# Patient Record
Sex: MALE | Race: WHITE
[De-identification: names, ages, dates, MRNs, and addresses within clinical notes are randomized per-mention and may not be internally consistent; named-entity substitution may affect disease eponyms.]

---

## 2018-01-20 ENCOUNTER — HOSPITAL ENCOUNTER (EMERGENCY)
Dept: HOSPITAL 54 - ER | Age: 58
Discharge: LEFT BEFORE DISCHARGE | End: 2018-01-20
Payer: MEDICAID

## 2018-01-20 VITALS — WEIGHT: 165 LBS | BODY MASS INDEX: 22.35 KG/M2 | HEIGHT: 72 IN

## 2018-01-20 VITALS — DIASTOLIC BLOOD PRESSURE: 93 MMHG | SYSTOLIC BLOOD PRESSURE: 150 MMHG
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
## 2018-01-20 DIAGNOSIS — S82.841A: Primary | ICD-10-CM
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
## 2018-01-20 DIAGNOSIS — S93.311A: ICD-10-CM

## 2018-01-20 LAB
APTT PPP: 25 SEC (ref 23–34)
BASOPHILS # BLD AUTO: 0.1 /CMM (ref 0–0.2)
BASOPHILS NFR BLD AUTO: 1.1 % (ref 0–2)
BUN SERPL-MCNC: 15 MG/DL (ref 7–18)
CALCIUM SERPL-MCNC: 8.6 MG/DL (ref 8.5–10.1)
CHLORIDE SERPL-SCNC: 101 MMOL/L (ref 98–107)
CO2 SERPL-SCNC: 21 MMOL/L (ref 21–32)
CREAT SERPL-MCNC: 1.8 MG/DL (ref 0.6–1.3)
EOSINOPHIL # BLD AUTO: 0.1 /CMM (ref 0–0.7)
EOSINOPHIL NFR BLD AUTO: 1.2 % (ref 0–6)
GLUCOSE SERPL-MCNC: 108 MG/DL (ref 74–106)
HCT VFR BLD AUTO: 43 % (ref 39–51)
HGB BLD-MCNC: 14.8 G/DL (ref 13.5–17.5)
INR PPP: 0.88 (ref 0.87–1.13)
LYMPHOCYTES NFR BLD AUTO: 1.7 /CMM (ref 0.8–4.8)
LYMPHOCYTES NFR BLD AUTO: 14.8 % (ref 20–44)
MCH RBC QN AUTO: 36 PG (ref 26–33)
MCHC RBC AUTO-ENTMCNC: 34 G/DL (ref 31–36)
MCV RBC AUTO: 105 FL (ref 80–96)
MONOCYTES NFR BLD AUTO: 0.6 /CMM (ref 0.1–1.3)
MONOCYTES NFR BLD AUTO: 5.4 % (ref 2–12)
NEUTROPHILS # BLD AUTO: 8.7 /CMM (ref 1.8–8.9)
NEUTROPHILS NFR BLD AUTO: 77.5 % (ref 43–81)
PLATELET # BLD AUTO: 279 /CMM (ref 150–450)
POTASSIUM SERPL-SCNC: 3.6 MMOL/L (ref 3.5–5.1)
RBC # BLD AUTO: 4.13 MIL/UL (ref 4.5–6)
RDW COEFFICIENT OF VARIATION: 14.9 (ref 11.5–15)
SODIUM SERPL-SCNC: 138 MMOL/L (ref 136–145)
WBC NRBC COR # BLD AUTO: 11.2 K/UL (ref 4.3–11)

## 2018-01-20 PROCEDURE — A4606 OXYGEN PROBE USED W OXIMETER: HCPCS

## 2018-01-20 PROCEDURE — Z7610: HCPCS

## 2018-01-20 RX ADMIN — DEXTROSE MONOHYDRATE ONE MG: 50 INJECTION, SOLUTION INTRAVENOUS at 05:52

## 2018-01-20 RX ADMIN — Medication ONE EACH: at 03:38

## 2018-01-20 RX ADMIN — Medication ONE MG: at 05:52

## 2018-01-20 RX ADMIN — SODIUM CHLORIDE ONE ML: 9 INJECTION, SOLUTION INTRAVENOUS at 05:10

## 2018-01-20 RX ADMIN — PROPOFOL ONE MG: 10 INJECTION, EMULSION INTRAVENOUS at 05:21

## 2018-01-20 NOTE — NUR
IV removed. Catheter intact and site benign. Pressure and 4x4 applied to site. 
No bleeding noted. Patient given crutches and instructed on proper technique. 
Patient does not wish to proceed with medical care recommended by Dr. Casas. 
Patient given information related to possible complications, up to and 
including death, which could occur as a result of leaving the hospital at this 
time. Patient verbalizes understanding of risks involved due to leaving against 
medical advice. Patient has signed AMA form.

## 2018-01-20 NOTE — NUR
PT TO ER BED 6. PT BIBRA FROM [facility] C/O GLF - LOC, NOTED R ANKLE 
DEFORMITY. PT PLACED IN GOWN AND ON CARDIAC MONITOR. VSS/RESP EVEN 
UNLABORED/NAD NOTED/SKIN WARM AND DRY/DENIES N-V-D/AOX4. AWAITING MD HATHAWAY.

-------------------------------------------------------------------------------

Addendum: 01/20/18 at 0347 by EL

-------------------------------------------------------------------------------

+ PEDAL PULSE ON R FOOT. + CSM

## 2023-05-01 ENCOUNTER — HOSPITAL ENCOUNTER (INPATIENT)
Dept: HOSPITAL 54 - ER | Age: 63
LOS: 5 days | Discharge: NURSING, REHAB OR LONG-TERM CARE | DRG: 308 | End: 2023-05-06
Attending: INTERNAL MEDICINE | Admitting: INTERNAL MEDICINE
Payer: MEDICAID

## 2023-05-01 VITALS — BODY MASS INDEX: 21.67 KG/M2 | WEIGHT: 160 LBS | HEIGHT: 72 IN

## 2023-05-01 VITALS — SYSTOLIC BLOOD PRESSURE: 149 MMHG | DIASTOLIC BLOOD PRESSURE: 91 MMHG

## 2023-05-01 VITALS — DIASTOLIC BLOOD PRESSURE: 91 MMHG | SYSTOLIC BLOOD PRESSURE: 149 MMHG

## 2023-05-01 DIAGNOSIS — I70.0: ICD-10-CM

## 2023-05-01 DIAGNOSIS — I82.411: ICD-10-CM
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
## 2023-05-01 DIAGNOSIS — E83.42: ICD-10-CM
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
## 2023-05-01 DIAGNOSIS — Z20.822: ICD-10-CM

## 2023-05-01 DIAGNOSIS — E03.9: ICD-10-CM

## 2023-05-01 DIAGNOSIS — D50.9: ICD-10-CM

## 2023-05-01 DIAGNOSIS — I82.431: ICD-10-CM
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
## 2023-05-01 DIAGNOSIS — S72.401A: Primary | ICD-10-CM

## 2023-05-01 DIAGNOSIS — N18.9: ICD-10-CM

## 2023-05-01 DIAGNOSIS — N17.0: ICD-10-CM

## 2023-05-01 LAB
BASOPHILS # BLD AUTO: 0.2 K/UL (ref 0–0.2)
BASOPHILS NFR BLD AUTO: 4 % (ref 0–2)
BUN SERPL-MCNC: 16 MG/DL (ref 7–18)
CALCIUM SERPL-MCNC: 8.6 MG/DL (ref 8.5–10.1)
CHLORIDE SERPL-SCNC: 104 MMOL/L (ref 98–107)
CO2 SERPL-SCNC: 22 MMOL/L (ref 21–32)
CREAT SERPL-MCNC: 1.8 MG/DL (ref 0.6–1.3)
EOSINOPHIL NFR BLD AUTO: 2.1 % (ref 0–6)
EOSINOPHIL NFR BLD MANUAL: 1 % (ref 0–4)
GLUCOSE SERPL-MCNC: 117 MG/DL (ref 74–106)
HCT VFR BLD AUTO: 35 % (ref 39–51)
HGB BLD-MCNC: 11.7 G/DL (ref 13.5–17.5)
LYMPHOCYTES NFR BLD AUTO: 1.1 K/UL (ref 0.8–4.8)
LYMPHOCYTES NFR BLD AUTO: 21 % (ref 20–44)
LYMPHOCYTES NFR BLD MANUAL: 28 % (ref 16–48)
MCHC RBC AUTO-ENTMCNC: 34 G/DL (ref 31–36)
MCV RBC AUTO: 107 FL (ref 80–96)
MONOCYTES NFR BLD AUTO: 0.5 K/UL (ref 0.1–1.3)
MONOCYTES NFR BLD AUTO: 9.2 % (ref 2–12)
MONOCYTES NFR BLD MANUAL: 9 % (ref 0–11)
NEUTROPHILS # BLD AUTO: 3.3 K/UL (ref 1.8–8.9)
NEUTROPHILS NFR BLD AUTO: 63.7 % (ref 43–81)
NEUTS BAND NFR BLD MANUAL: 1 % (ref 0–5)
NEUTS SEG NFR BLD MANUAL: 61 % (ref 42–76)
PLATELET # BLD AUTO: 227 K/UL (ref 150–450)
POTASSIUM SERPL-SCNC: 4.7 MMOL/L (ref 3.5–5.1)
RBC # BLD AUTO: 3.25 MIL/UL (ref 4.5–6)
SODIUM SERPL-SCNC: 138 MMOL/L (ref 136–145)
WBC NRBC COR # BLD AUTO: 5.2 K/UL (ref 4.3–11)

## 2023-05-01 PROCEDURE — C1880 VENA CAVA FILTER: HCPCS

## 2023-05-01 PROCEDURE — C9803 HOPD COVID-19 SPEC COLLECT: HCPCS

## 2023-05-01 PROCEDURE — G0378 HOSPITAL OBSERVATION PER HR: HCPCS

## 2023-05-01 PROCEDURE — A4223 INFUSION SUPPLIES W/O PUMP: HCPCS

## 2023-05-01 PROCEDURE — L1830 KO IMMOB CANVAS LONG PRE OTS: HCPCS

## 2023-05-01 PROCEDURE — C1769 GUIDE WIRE: HCPCS

## 2023-05-01 PROCEDURE — C1713 ANCHOR/SCREW BN/BN,TIS/BN: HCPCS

## 2023-05-01 PROCEDURE — A4217 STERILE WATER/SALINE, 500 ML: HCPCS

## 2023-05-01 RX ADMIN — HYDROMORPHONE HYDROCHLORIDE PRN MG: 1 INJECTION, SOLUTION INTRAMUSCULAR; INTRAVENOUS; SUBCUTANEOUS at 22:16

## 2023-05-01 RX ADMIN — Medication PRN TAB: at 21:11

## 2023-05-01 NOTE — NUR
RN NOTES

PATIENT COMPLAINS OF 9/10 RIGHT KNEE PAIN. NORCO NOT EFFECTIVE. WILL ADMINISTER DILAUDID 1 
ML FOR PAIN 9/10 PER ORDER. WILL ASSESS IN 30 MIN.

## 2023-05-01 NOTE — NUR
MS RN OPENING NOTES

RECEIVED PATIENT FROM ER WITH JENNIFER AT 2046. PATIENT IS A/O TIMES 4. NO PAIN NOTED. NO SOB 
NOTED. NO DISTRESS NOTED. ABLE TO MAKE NEEDS KNOWN. IV ACCESS ON THE RAC NOT INTACT. CHANGED 
THE IV ACCESS TO LEFT FOREARM G # 22. ON D5NS  ML/HR. PATIENT REFUSED SKIN CHECKS AT 
THIS TIME HE STATED: " IT IS SO PAINFUL I CAN NOT MOVE." BELONGING CHECKS DONE. PATIENT 
SIGNED THE BELONGING LIST. ALL NEEDS ATTENDED. ALL SAFETY MEASURES IN PLACE. BED LOCKED IN 
THE LOWEST POSITION. CALL LIGHT AND TABLE IN EASY REACH. SIDE RAILS UP TIMES 2. WILL 
CONTINUE TO MONITOR CLOSELY.

## 2023-05-01 NOTE — NUR
Pt is noted in bed alert, responsive as report is received from the off going 
nurse that , Pt came in C/O  Right Knee pain and Head pain due to S/P Fall 
while walking His Dog. Pt care continue as he will be monitor closely andf Due 
for Admission.

## 2023-05-01 NOTE — NUR
PT CLINICALS FAXED TO [facility]  AWAITING FOR DR. MAZARIEGOS'S NOTE THAT 
CONTAINS "PT IS STABLE FOR TRANSFER"

## 2023-05-01 NOTE — NUR
TALKED TO  ALEXA.

NEEDS COVID RESULTS AND NOTE FROM  STATING THE PATIENT IS STABLE.

 SEND TO [phone]

## 2023-05-01 NOTE — NUR
RN NOTES

PATIENT COMPLAINS OF 9/10 PAIN OF THE RIGHT KNEE. NORCO 10/325 MG GIVEN AS ORDERED FOR PAIN 
AT 2111. WILL ASSESS IN 1 HOUR.

## 2023-05-02 VITALS — SYSTOLIC BLOOD PRESSURE: 135 MMHG | DIASTOLIC BLOOD PRESSURE: 80 MMHG

## 2023-05-02 VITALS — SYSTOLIC BLOOD PRESSURE: 152 MMHG | DIASTOLIC BLOOD PRESSURE: 80 MMHG

## 2023-05-02 VITALS — DIASTOLIC BLOOD PRESSURE: 75 MMHG | SYSTOLIC BLOOD PRESSURE: 129 MMHG

## 2023-05-02 LAB
ALBUMIN SERPL BCP-MCNC: 2.9 G/DL (ref 3.4–5)
ALP SERPL-CCNC: 115 U/L (ref 46–116)
ALT SERPL W P-5'-P-CCNC: 25 U/L (ref 12–78)
AST SERPL W P-5'-P-CCNC: 25 U/L (ref 15–37)
BASOPHILS # BLD AUTO: 0.1 K/UL (ref 0–0.2)
BASOPHILS NFR BLD AUTO: 1.6 % (ref 0–2)
BILIRUB SERPL-MCNC: 0.7 MG/DL (ref 0.2–1)
BUN SERPL-MCNC: 19 MG/DL (ref 7–18)
BUN SERPL-MCNC: 19 MG/DL (ref 7–18)
CALCIUM SERPL-MCNC: 8.2 MG/DL (ref 8.5–10.1)
CALCIUM SERPL-MCNC: 8.3 MG/DL (ref 8.5–10.1)
CHLORIDE SERPL-SCNC: 104 MMOL/L (ref 98–107)
CHLORIDE SERPL-SCNC: 104 MMOL/L (ref 98–107)
CHOLEST SERPL-MCNC: 136 MG/DL (ref ?–200)
CO2 SERPL-SCNC: 22 MMOL/L (ref 21–32)
CO2 SERPL-SCNC: 23 MMOL/L (ref 21–32)
CREAT SERPL-MCNC: 1.8 MG/DL (ref 0.6–1.3)
CREAT SERPL-MCNC: 1.8 MG/DL (ref 0.6–1.3)
EOSINOPHIL NFR BLD AUTO: 2.8 % (ref 0–6)
FERRITIN SERPL-MCNC: 399 NG/ML (ref 8–388)
GLUCOSE SERPL-MCNC: 126 MG/DL (ref 74–106)
GLUCOSE SERPL-MCNC: 131 MG/DL (ref 74–106)
HCT VFR BLD AUTO: 31 % (ref 39–51)
HDLC SERPL-MCNC: 98 MG/DL (ref 40–60)
HGB BLD-MCNC: 10.1 G/DL (ref 13.5–17.5)
IRON SERPL-MCNC: 24 UG/DL (ref 50–175)
LDLC SERPL DIRECT ASSAY-MCNC: 33 MG/DL (ref 0–99)
LYMPHOCYTES NFR BLD AUTO: 1.5 K/UL (ref 0.8–4.8)
LYMPHOCYTES NFR BLD AUTO: 27.8 % (ref 20–44)
MAGNESIUM SERPL-MCNC: 1.4 MG/DL (ref 1.8–2.4)
MCHC RBC AUTO-ENTMCNC: 33 G/DL (ref 31–36)
MCV RBC AUTO: 110 FL (ref 80–96)
MONOCYTES NFR BLD AUTO: 0.9 K/UL (ref 0.1–1.3)
MONOCYTES NFR BLD AUTO: 16.1 % (ref 2–12)
NEUTROPHILS # BLD AUTO: 2.7 K/UL (ref 1.8–8.9)
NEUTROPHILS NFR BLD AUTO: 51.7 % (ref 43–81)
PLATELET # BLD AUTO: 170 K/UL (ref 150–450)
POTASSIUM SERPL-SCNC: 4.4 MMOL/L (ref 3.5–5.1)
POTASSIUM SERPL-SCNC: 4.4 MMOL/L (ref 3.5–5.1)
PROT SERPL-MCNC: 6.5 G/DL (ref 6.4–8.2)
RBC # BLD AUTO: 2.8 MIL/UL (ref 4.5–6)
SODIUM SERPL-SCNC: 136 MMOL/L (ref 136–145)
SODIUM SERPL-SCNC: 137 MMOL/L (ref 136–145)
TIBC SERPL-MCNC: 173 UG/DL (ref 250–450)
TRIGL SERPL-MCNC: 76 MG/DL (ref 30–150)
TSH SERPL DL<=0.005 MIU/L-ACNC: 7.13 UIU/ML (ref 0.36–3.74)
WBC NRBC COR # BLD AUTO: 5.3 K/UL (ref 4.3–11)

## 2023-05-02 RX ADMIN — SODIUM CHLORIDE PRN MLS/HR: 9 INJECTION, SOLUTION INTRAVENOUS at 08:52

## 2023-05-02 RX ADMIN — MAGNESIUM SULFATE IN DEXTROSE SCH MLS/HR: 10 INJECTION, SOLUTION INTRAVENOUS at 10:09

## 2023-05-02 RX ADMIN — HYDROMORPHONE HYDROCHLORIDE PRN MG: 1 INJECTION, SOLUTION INTRAMUSCULAR; INTRAVENOUS; SUBCUTANEOUS at 10:42

## 2023-05-02 RX ADMIN — HYDROMORPHONE HYDROCHLORIDE PRN MG: 1 INJECTION, SOLUTION INTRAMUSCULAR; INTRAVENOUS; SUBCUTANEOUS at 01:22

## 2023-05-02 RX ADMIN — HYDROMORPHONE HYDROCHLORIDE PRN MG: 1 INJECTION, SOLUTION INTRAMUSCULAR; INTRAVENOUS; SUBCUTANEOUS at 20:34

## 2023-05-02 RX ADMIN — MAGNESIUM SULFATE IN DEXTROSE SCH MLS/HR: 10 INJECTION, SOLUTION INTRAVENOUS at 08:52

## 2023-05-02 RX ADMIN — HYDROMORPHONE HYDROCHLORIDE PRN MG: 1 INJECTION, SOLUTION INTRAMUSCULAR; INTRAVENOUS; SUBCUTANEOUS at 07:42

## 2023-05-02 RX ADMIN — HYDROMORPHONE HYDROCHLORIDE PRN MG: 1 INJECTION, SOLUTION INTRAMUSCULAR; INTRAVENOUS; SUBCUTANEOUS at 17:07

## 2023-05-02 RX ADMIN — Medication PRN TAB: at 15:07

## 2023-05-02 RX ADMIN — Medication PRN TAB: at 23:39

## 2023-05-02 RX ADMIN — HYDROMORPHONE HYDROCHLORIDE PRN MG: 1 INJECTION, SOLUTION INTRAMUSCULAR; INTRAVENOUS; SUBCUTANEOUS at 13:57

## 2023-05-02 RX ADMIN — Medication PRN TAB: at 19:21

## 2023-05-02 RX ADMIN — HYDROMORPHONE HYDROCHLORIDE PRN MG: 1 INJECTION, SOLUTION INTRAMUSCULAR; INTRAVENOUS; SUBCUTANEOUS at 04:26

## 2023-05-02 NOTE — NUR
RN NOTES

PRN DILAUDID 1 ML GIVEN FOR PAIN 9/1O OF THE RIGHT FEMUR PER PATIENT REQUEST AT 0426. WILL 
ASSESS IN 30 MIN.

## 2023-05-02 NOTE — NUR
MS RN CLOSING NOTES



 PATIENT AWAKE IN BED, A/Ox4, ABLE TO MAKE NEEDS KNOWN. ON ROOM AIR, NO S/S OF RESPIRATORY 
DISTRESS. COMPLAINING OF PAIN 8/10 OF R LEG. IV ACCESS LFA #22G RUNNING D5NS @100 ML/HR. 
INTACT AND PATENT. BEDREST, CONTINENT, USES URINAL. SKIN ISSUES: R LEG WRAPPED AND 
IMMOBILIZED, PATIENT REFUSING SKIN ASSESSMENT. SAFETY MEASURES IN PLACE: BED LOCKED AND IN 
LOWEST POSITION, HOB ELEVATED, SIDE RAILS UPx2, CALL LIGHT WITHIN REACH. ALL MEDS 
ADMINISTERED AS SCHEDULED. WILL ENDORSE TO NEXT SHIFT.

## 2023-05-02 NOTE — NUR
RN NOTES

PRN DILAUDID 1 ML GIVEN FOR PAIN 9/10 OF THE RIGHT FEMUR PER PATIENT REQUEST AT 0122. WILL 
ASSES IN 30 MIN.

## 2023-05-02 NOTE — NUR
MS RN OPENING NOTES



 PATIENT AWAKE IN BED, A/Ox4, ABLE TO MAKE NEEDS KNOWN. ON ROOM AIR, NO S/S OF RESPIRATORY 
DISTRESS. COMPLAINING OF PAIN 8/10 OF R LEG. IV ACCESS LFA #22G RUNNING D5NS @100 ML/HR. 
INTACT AND PATENT. BEDREST, CONTINENT, USES URINAL. SKIN ISSUES: R LEG WRAPPED AND 
IMMOBILIZED, PATIENT REFUSING SKIN ASSESSMENT. SAFETY MEASURES IN PLACE: BED LOCKED AND IN 
LOWEST POSITION, HOB ELEVATED, SIDE RAILS UPx2, CALL LIGHT WITHIN REACH. PRN NORCO GIVEN FOR 
9/10 PAIN TOLERATED WELL.

## 2023-05-02 NOTE — NUR
RN NOTES



PATIENT COMPLAINED OF PAIN 9/10 OF R LEG, PRN DILAUDID ADMINISTERED. EXPLAINED TO PATIENT 
THAT NARCO 5-325 1 TAB AND 2 TAB WERE ORDERED, IF DILAUDID IS NOT KEEPING PAIN UNDER 
CONTROL.

## 2023-05-02 NOTE — NUR
MS RN OPENING NOTES



RECEIVED PATIENT AWAKE IN BED, A/Ox4, ABLE TO MAKE NEEDS KNOWN. ON ROOM AIR, NO S/S OF 
RESPIRATORY DISTRESS. COMPLAINING OF PAIN 9/10 OF R LEG. IV ACCESS LFA #22G RUNNING D5NS 
@100 ML/HR. INTACT AND PATENT. BEDREST, CONTINENT, USES URINAL. SKIN ISSUES: R LEG WRAPPED 
AND IMMOBILIZED, PATIENT REFUSING SKIN ASSESSMENT. SAFETY MEASURES IN PLACE: BED LOCKED AND 
IN LOWEST POSITION, HOB ELEVATED, SIDE RAILS UPx2, CALL LIGHT WITHIN REACH. WILL CONTINUE TO 
MONITOR.

## 2023-05-03 VITALS — DIASTOLIC BLOOD PRESSURE: 78 MMHG | SYSTOLIC BLOOD PRESSURE: 137 MMHG

## 2023-05-03 VITALS — DIASTOLIC BLOOD PRESSURE: 77 MMHG | SYSTOLIC BLOOD PRESSURE: 137 MMHG

## 2023-05-03 VITALS — DIASTOLIC BLOOD PRESSURE: 76 MMHG | SYSTOLIC BLOOD PRESSURE: 140 MMHG

## 2023-05-03 LAB
ALBUMIN SERPL BCP-MCNC: 2.5 G/DL (ref 3.4–5)
ALP SERPL-CCNC: 98 U/L (ref 46–116)
ALT SERPL W P-5'-P-CCNC: 21 U/L (ref 12–78)
AST SERPL W P-5'-P-CCNC: 20 U/L (ref 15–37)
BASOPHILS # BLD AUTO: 0 K/UL (ref 0–0.2)
BASOPHILS NFR BLD AUTO: 0.9 % (ref 0–2)
BILIRUB SERPL-MCNC: 0.5 MG/DL (ref 0.2–1)
BUN SERPL-MCNC: 14 MG/DL (ref 7–18)
CALCIUM SERPL-MCNC: 8.2 MG/DL (ref 8.5–10.1)
CHLORIDE SERPL-SCNC: 105 MMOL/L (ref 98–107)
CO2 SERPL-SCNC: 21 MMOL/L (ref 21–32)
CREAT SERPL-MCNC: 1.4 MG/DL (ref 0.6–1.3)
EOSINOPHIL NFR BLD AUTO: 3 % (ref 0–6)
GLUCOSE SERPL-MCNC: 101 MG/DL (ref 74–106)
HCT VFR BLD AUTO: 29 % (ref 39–51)
HGB BLD-MCNC: 9.2 G/DL (ref 13.5–17.5)
LYMPHOCYTES NFR BLD AUTO: 1.2 K/UL (ref 0.8–4.8)
LYMPHOCYTES NFR BLD AUTO: 23.6 % (ref 20–44)
MAGNESIUM SERPL-MCNC: 1.8 MG/DL (ref 1.8–2.4)
MCHC RBC AUTO-ENTMCNC: 32 G/DL (ref 31–36)
MCV RBC AUTO: 111 FL (ref 80–96)
MONOCYTES NFR BLD AUTO: 0.6 K/UL (ref 0.1–1.3)
MONOCYTES NFR BLD AUTO: 13.2 % (ref 2–12)
NEUTROPHILS # BLD AUTO: 2.9 K/UL (ref 1.8–8.9)
NEUTROPHILS NFR BLD AUTO: 59.3 % (ref 43–81)
PHOSPHATE SERPL-MCNC: 2.9 MG/DL (ref 2.5–4.9)
PLATELET # BLD AUTO: 144 K/UL (ref 150–450)
POTASSIUM SERPL-SCNC: 4.4 MMOL/L (ref 3.5–5.1)
PROT SERPL-MCNC: 6 G/DL (ref 6.4–8.2)
RBC # BLD AUTO: 2.59 MIL/UL (ref 4.5–6)
SODIUM SERPL-SCNC: 136 MMOL/L (ref 136–145)
WBC NRBC COR # BLD AUTO: 4.9 K/UL (ref 4.3–11)

## 2023-05-03 RX ADMIN — SODIUM CHLORIDE PRN MLS/HR: 9 INJECTION, SOLUTION INTRAVENOUS at 04:55

## 2023-05-03 RX ADMIN — HYDROMORPHONE HYDROCHLORIDE PRN MG: 1 INJECTION, SOLUTION INTRAMUSCULAR; INTRAVENOUS; SUBCUTANEOUS at 14:08

## 2023-05-03 RX ADMIN — Medication PRN TAB: at 03:52

## 2023-05-03 RX ADMIN — HEPARIN SODIUM PRN MLS/HR: 5000 INJECTION, SOLUTION INTRAVENOUS at 11:52

## 2023-05-03 RX ADMIN — HYDROMORPHONE HYDROCHLORIDE PRN MG: 1 INJECTION, SOLUTION INTRAMUSCULAR; INTRAVENOUS; SUBCUTANEOUS at 18:26

## 2023-05-03 RX ADMIN — Medication PRN TAB: at 12:08

## 2023-05-03 RX ADMIN — SODIUM CHLORIDE PRN MLS/HR: 9 INJECTION, SOLUTION INTRAVENOUS at 23:34

## 2023-05-03 RX ADMIN — HYDROMORPHONE HYDROCHLORIDE PRN MG: 1 INJECTION, SOLUTION INTRAMUSCULAR; INTRAVENOUS; SUBCUTANEOUS at 10:07

## 2023-05-03 RX ADMIN — HYDROMORPHONE HYDROCHLORIDE PRN MG: 1 INJECTION, SOLUTION INTRAMUSCULAR; INTRAVENOUS; SUBCUTANEOUS at 04:54

## 2023-05-03 RX ADMIN — HYDROMORPHONE HYDROCHLORIDE PRN MG: 1 INJECTION, SOLUTION INTRAMUSCULAR; INTRAVENOUS; SUBCUTANEOUS at 22:06

## 2023-05-03 RX ADMIN — Medication PRN TAB: at 08:00

## 2023-05-03 RX ADMIN — SODIUM CHLORIDE SCH MLS/HR: 9 INJECTION, SOLUTION INTRAVENOUS at 15:49

## 2023-05-03 RX ADMIN — Medication PRN TAB: at 20:11

## 2023-05-03 RX ADMIN — HYDROMORPHONE HYDROCHLORIDE PRN MG: 1 INJECTION, SOLUTION INTRAMUSCULAR; INTRAVENOUS; SUBCUTANEOUS at 00:57

## 2023-05-03 RX ADMIN — Medication PRN TAB: at 16:04

## 2023-05-03 RX ADMIN — SODIUM CHLORIDE PRN MLS/HR: 9 INJECTION, SOLUTION INTRAVENOUS at 14:08

## 2023-05-03 NOTE — NUR
RN MS NOTES

PT IN BED, AWAKE, ALERT AND ORIENTED, WATCHING TV, PAIN MEDS GIVEN FOR PAIN MANAGEMENT AS 
ORDERED, TOLERATES WELL, SEEN BY DR. GRAVES TODAY, ORDERED TO START ON HEPARIN DRIP PER NON 
ACS PROTOCOL, NEEDS ATTENDED.

## 2023-05-03 NOTE — NUR
RN notes

Pt signed a consent for insertion vena cava filter per MD ordered. Pt verbalize 
understanding.

## 2023-05-03 NOTE — NUR
RN MS NOTES

RECEIVED RESULT FOR CURRENT PTT, 170, DR. GRAVES INFORMED PER PROTOCOL, AWAITING CALL BACK FOR 
ORDERS, PT HAS NO COMPLAINT, NO S/S OF BLEEDING, HEPARIN DRIP HELD.

## 2023-05-03 NOTE — NUR
RN opening notes

Received Pt from morning nurse. Pt is sitting in bed comfortably watching TV. Pt is alert 
and orientedX4. On room air. No SOB. No S/S of distress noted. IV site at LFA# 22 is clean, 
intact and infusing well NS@ 125 ml/hr. L wrist # 20 is clean, intact and SL. Held heparin 
for 1 hr per hospital protocol. . DR Hartmann is aware and informed per am nurse. safety 
precautions is maintained. bed at low position, brakes locked, side rails upX3, hob 
elevated, bed alarm is on and call light is within reach. will continue to monitor.

## 2023-05-03 NOTE — NUR
RN MS NOTES

PT IN BED, AWAKE, ALERT AND ORIENTED, NO COMPLAINT AT THIS TIME, RESPIRATIONS NORMAL, CALL 
LIGHT WITHIN REACH, IV FLUIDS INFUSING WELL, NEEDS ATTENDED.

## 2023-05-03 NOTE — NUR
RN MS NOTES

PT IN BED, AWAKE, ALERT AND ORIENTED, WATCHING TV, PAIN MEDS GIVEN FOR PAIN MANAGEMENT, PT 
REFUSED TO BE REPOSITIONED, PT SIGNED CONSENT FORMS, AWARE THAT HE WILLL BE NPO STARTING 
MIDNIGHT, IV FLUIDS INFUSING, ON HEPARIN DRIP, NO S/S OF BLEEDING, AWAITING LATEST PTT 
RESULT, ALL NEEDS ATTENDED.

## 2023-05-03 NOTE — NUR
RN notes

Pt is complaining of R leg pain 10/10 on pain scale. administered dilaudid 1 mg/iv as 
ordered. VS is stable. safety precautions is maintained. will continue to monitor.

## 2023-05-03 NOTE — NUR
MS RN CLOSING NOTES



 PATIENT AWAKE IN BED, A/Ox4, ABLE TO MAKE NEEDS KNOWN. ON ROOM AIR, NO S/S OF RESPIRATORY 
DISTRESS. COMPLAINING OF PAIN 8/10 OF R LEG. IV ACCESS LFA #22G RUNNING D5NS @125 ML/HR. 
INTACT AND PATENT. BEDREST, CONTINENT, USES URINAL. SKIN ISSUES: R LEG WRAPPED AND 
IMMOBILIZED, PATIENT REFUSING SKIN ASSESSMENT. SAFETY MEASURES IN PLACE: BED LOCKED AND IN 
LOWEST POSITION, HOB ELEVATED, SIDE RAILS UPx2, CALL LIGHT WITHIN REACH. WILL ENDORSE CARE 
TO DAY SHIFT NURSE FOR EDDA.

## 2023-05-03 NOTE — NUR
RN notes

Pt is complaining of R leg pain and requesting pain med. administered norco5/2 tabs/po/prn 
as ordered. VS is stable. safety precautions is maintained. will continue to monitor.

## 2023-05-03 NOTE — NUR
RN notes

Heparin drip started at 1954 per hospital protocol. heparin drip at 1100 U/hr. No S/s of 
bleeding noted. PTT scheduled at 0154 am.

## 2023-05-03 NOTE — NUR
RN MS NOTES

PT SEEN AND EXAMINED BY RAISSA HALE, PLAN OF CARE DISCUSSED WITH PT, VERBALIZED 
UNDERSTANDING.

## 2023-05-03 NOTE — NUR
RN MS NOTES

DR. GRAVES INFORMED OF CURRENT PTT RESULT , PER MD FOLLOW PROTOCOL, HEPARIN HELD FOR 
NOW.

## 2023-05-04 VITALS — DIASTOLIC BLOOD PRESSURE: 82 MMHG | SYSTOLIC BLOOD PRESSURE: 145 MMHG

## 2023-05-04 VITALS — DIASTOLIC BLOOD PRESSURE: 84 MMHG | SYSTOLIC BLOOD PRESSURE: 135 MMHG

## 2023-05-04 VITALS — SYSTOLIC BLOOD PRESSURE: 140 MMHG | DIASTOLIC BLOOD PRESSURE: 88 MMHG

## 2023-05-04 VITALS — DIASTOLIC BLOOD PRESSURE: 80 MMHG | SYSTOLIC BLOOD PRESSURE: 134 MMHG

## 2023-05-04 VITALS — SYSTOLIC BLOOD PRESSURE: 145 MMHG | DIASTOLIC BLOOD PRESSURE: 82 MMHG

## 2023-05-04 VITALS — SYSTOLIC BLOOD PRESSURE: 136 MMHG | DIASTOLIC BLOOD PRESSURE: 69 MMHG

## 2023-05-04 LAB
BASOPHILS # BLD AUTO: 0 K/UL (ref 0–0.2)
BASOPHILS NFR BLD AUTO: 0.8 % (ref 0–2)
BUN SERPL-MCNC: 11 MG/DL (ref 7–18)
CALCIUM SERPL-MCNC: 8.4 MG/DL (ref 8.5–10.1)
CHLORIDE SERPL-SCNC: 105 MMOL/L (ref 98–107)
CO2 SERPL-SCNC: 25 MMOL/L (ref 21–32)
CREAT SERPL-MCNC: 1.3 MG/DL (ref 0.6–1.3)
EOSINOPHIL NFR BLD AUTO: 2.7 % (ref 0–6)
GLUCOSE SERPL-MCNC: 97 MG/DL (ref 74–106)
HCT VFR BLD AUTO: 26 % (ref 39–51)
HCT VFR BLD AUTO: 29 % (ref 39–51)
HGB BLD-MCNC: 8.8 G/DL (ref 13.5–17.5)
HGB BLD-MCNC: 9.3 G/DL (ref 13.5–17.5)
LYMPHOCYTES NFR BLD AUTO: 1.1 K/UL (ref 0.8–4.8)
LYMPHOCYTES NFR BLD AUTO: 25 % (ref 20–44)
MCHC RBC AUTO-ENTMCNC: 33 G/DL (ref 31–36)
MCV RBC AUTO: 109 FL (ref 80–96)
MONOCYTES NFR BLD AUTO: 0.6 K/UL (ref 0.1–1.3)
MONOCYTES NFR BLD AUTO: 12.3 % (ref 2–12)
NEUTROPHILS # BLD AUTO: 2.7 K/UL (ref 1.8–8.9)
NEUTROPHILS NFR BLD AUTO: 59.2 % (ref 43–81)
PLATELET # BLD AUTO: 152 K/UL (ref 150–450)
POTASSIUM SERPL-SCNC: 5 MMOL/L (ref 3.5–5.1)
RBC # BLD AUTO: 2.43 MIL/UL (ref 4.5–6)
SODIUM SERPL-SCNC: 136 MMOL/L (ref 136–145)
WBC NRBC COR # BLD AUTO: 4.5 K/UL (ref 4.3–11)

## 2023-05-04 PROCEDURE — 06H03DZ INSERTION OF INTRALUMINAL DEVICE INTO INFERIOR VENA CAVA, PERCUTANEOUS APPROACH: ICD-10-PCS | Performed by: SURGERY

## 2023-05-04 PROCEDURE — 0QSB04Z REPOSITION RIGHT LOWER FEMUR WITH INTERNAL FIXATION DEVICE, OPEN APPROACH: ICD-10-PCS | Performed by: STUDENT IN AN ORGANIZED HEALTH CARE EDUCATION/TRAINING PROGRAM

## 2023-05-04 PROCEDURE — B519YZA FLUOROSCOPY OF INFERIOR VENA CAVA USING OTHER CONTRAST, GUIDANCE: ICD-10-PCS

## 2023-05-04 RX ADMIN — HEPARIN SODIUM PRN MLS/HR: 5000 INJECTION, SOLUTION INTRAVENOUS at 10:29

## 2023-05-04 RX ADMIN — Medication PRN TAB: at 05:32

## 2023-05-04 RX ADMIN — HYDROMORPHONE HYDROCHLORIDE PRN MG: 1 INJECTION, SOLUTION INTRAMUSCULAR; INTRAVENOUS; SUBCUTANEOUS at 14:21

## 2023-05-04 RX ADMIN — HYDROMORPHONE HYDROCHLORIDE PRN MG: 1 INJECTION, SOLUTION INTRAMUSCULAR; INTRAVENOUS; SUBCUTANEOUS at 10:29

## 2023-05-04 RX ADMIN — SODIUM CHLORIDE SCH MLS/HR: 9 INJECTION, SOLUTION INTRAVENOUS at 14:15

## 2023-05-04 RX ADMIN — Medication PRN TAB: at 11:44

## 2023-05-04 RX ADMIN — SODIUM CHLORIDE PRN MLS/HR: 9 INJECTION, SOLUTION INTRAVENOUS at 22:37

## 2023-05-04 RX ADMIN — SODIUM CHLORIDE PRN MLS/HR: 9 INJECTION, SOLUTION INTRAVENOUS at 10:24

## 2023-05-04 RX ADMIN — Medication PRN TAB: at 00:15

## 2023-05-04 NOTE — NUR
RN notes

Pt refuses to be changed. Explained risks and benefits. offered several times. Pt keep 
refusing.

## 2023-05-04 NOTE — NUR
RN notes

Received Pt from OR nurse Anabel Obrien RN. Pt just came back from IVC and R distal femur 
displaced intra articular comminuted fx SX with Dr. Corea. Pt is alert and orientedX3 with 
episode of confusion. On room air. VS is stable. No SOB. No S/s of distress noted. Pt denies 
any pain at this time. IV site at L wrist is clean, intact and SL. Iv site at LFA# 22 is 
clean, intact and flushes well. Self cath is inplaced and draining yellow urine. Per SUKHI Little swallow eval is done at OR. R groin IVC is clean, intact and dry. R knee ace wrap and 
immobilizer  is clean, intact and dry. skin is warm to touch, circulation is check, cap 
refill is less than 3 second. Ordered received from Dr. Corea. Per SUKHI Little they send fax 
meds to pharmacy. MD also ordered to stop heparin drip and start lovenox/daily. Per SUKHI Little, 
Dr. Corea no need to transfuse plasma. CBC is WNL. Safety precautions is maintained. Will 
continue to monitor.

## 2023-05-04 NOTE — NUR
RN notes

Noted Pt disconnected IV fluids and heparin drip. Connected back Pt to IV fluid and heparin 
drip. Explained risks and benefits. Pt keep saying "I'll be fine. Don't worry." Charge nurse 
is aware and informed.

## 2023-05-04 NOTE — NUR
RN closing notes

Pt is resting in bed comfortably. Pt is alert and orientedX4. On room air. No SOB. No S/S of 
distress noted. VS is stable. IV site at LFA# 22 is clean, intact and infusing well NS@ 125 
ml/hr. L wrist # 20 is clean, intact and infusing well heparin @ 1100 U/HR per hospital 
protocol. No S/S of bleeding noted. R leg immobilizer inplaced and offload. NPO since 
midnight. Kept Pt clean, dry and comfortbale. safety precautions is maintained. bed at low 
position, brakes locked, side rails upX3, hob elevated, bed alarm is on and call light is 
within reach. Will endorse to am nurse for EDDA.

## 2023-05-04 NOTE — NUR
RN notes

Received a phone call from [facility]. Lela informed that they didnt received 
fax order for ancef. Lela also informed to DC heparin. Dr. Corea ordered to DC heparin. 
Order carried out. Charge nurse is aware and informed.

## 2023-05-04 NOTE — NUR
RN MS NOTES

PT IN BED, AWAKE, ALERT AND ORIENTED, NO COMPLAINT AT THIS TIME, BREATHING PATTERN NORMAL, 
CALL LIGHT WITHIN REACH, IV FLUIDS AND HEPARIN DRIP INFUSING WELL, NO S/S OF BLEEDING NOTED, 
ASKING FOR A SIP OF COFFEE, EXPLAINED TO PT THAT HE IS NPO IN PREPARATION FOR TODAY'S 
PROCEDURES.

## 2023-05-04 NOTE — NUR
RN notes

Called [facility] and spoke with Nader regarding ancef abx one more order to infuse 
from Dr. Corea. Post op sx. Per Anabel Obrien RN, they send fax the pharmacy. Charge nurse 
is aware and informed.

## 2023-05-04 NOTE — NUR
RN MS NOTES

PT IN BED, AWAKE, ALERT AND ORIENTED, PAIN MEDS GIVEN FOR PAIN MANAGEMENT, IV FLUIDS 
INFUSING WELL, CHECKLIST COMPLETED, PICKED UP BY O.R. STAFF, LEFT UNIT VIA BED IN STABLE 
CONDITION.

## 2023-05-04 NOTE — NUR
RN notes

Nursing bedside swallow eval is done and performed. No S/S of distress noted. Pt tolerated 
well.

## 2023-05-04 NOTE — NUR
RN notes

Pt's PTT at 0200 is 60.3. No change per hospital heparin protocol. Next PTT at 0800 am. 
Charge nurse is aware and informed. No S/S of bleeding noted. will continue to monitor.

## 2023-05-04 NOTE — NUR
RN notes

Pt gave his cigarette and lighter. Keep in the safe by nursing station. Explained risks and 
benefits.

## 2023-05-05 VITALS — DIASTOLIC BLOOD PRESSURE: 74 MMHG | SYSTOLIC BLOOD PRESSURE: 138 MMHG

## 2023-05-05 VITALS — DIASTOLIC BLOOD PRESSURE: 84 MMHG | SYSTOLIC BLOOD PRESSURE: 147 MMHG

## 2023-05-05 VITALS — DIASTOLIC BLOOD PRESSURE: 81 MMHG | SYSTOLIC BLOOD PRESSURE: 136 MMHG

## 2023-05-05 VITALS — DIASTOLIC BLOOD PRESSURE: 76 MMHG | SYSTOLIC BLOOD PRESSURE: 139 MMHG

## 2023-05-05 RX ADMIN — SODIUM CHLORIDE SCH MLS/HR: 9 INJECTION, SOLUTION INTRAVENOUS at 18:14

## 2023-05-05 RX ADMIN — Medication PRN TAB: at 09:04

## 2023-05-05 RX ADMIN — APIXABAN SCH MG: 5 TABLET, FILM COATED ORAL at 09:10

## 2023-05-05 RX ADMIN — APIXABAN SCH MG: 5 TABLET, FILM COATED ORAL at 17:10

## 2023-05-05 RX ADMIN — Medication PRN TAB: at 18:52

## 2023-05-05 RX ADMIN — HYDROMORPHONE HYDROCHLORIDE PRN MG: 1 INJECTION, SOLUTION INTRAMUSCULAR; INTRAVENOUS; SUBCUTANEOUS at 05:59

## 2023-05-05 RX ADMIN — Medication PRN TAB: at 22:55

## 2023-05-05 RX ADMIN — Medication PRN TAB: at 04:00

## 2023-05-05 RX ADMIN — HYDROMORPHONE HYDROCHLORIDE PRN MG: 1 INJECTION, SOLUTION INTRAMUSCULAR; INTRAVENOUS; SUBCUTANEOUS at 11:04

## 2023-05-05 RX ADMIN — HYDROMORPHONE HYDROCHLORIDE PRN MG: 1 INJECTION, SOLUTION INTRAMUSCULAR; INTRAVENOUS; SUBCUTANEOUS at 15:55

## 2023-05-05 RX ADMIN — HYDROMORPHONE HYDROCHLORIDE PRN MG: 1 INJECTION, SOLUTION INTRAMUSCULAR; INTRAVENOUS; SUBCUTANEOUS at 20:57

## 2023-05-05 RX ADMIN — HYDROMORPHONE HYDROCHLORIDE PRN MG: 1 INJECTION, SOLUTION INTRAMUSCULAR; INTRAVENOUS; SUBCUTANEOUS at 00:50

## 2023-05-05 RX ADMIN — Medication PRN TAB: at 14:39

## 2023-05-05 NOTE — NUR
RN notes

Pt is complaining of R knee pain 10/10 on pain scale. Administered dilaudid/iv/prn as 
ordered. VS is stable. safety precautions is maintained. will continue to monitor.

## 2023-05-05 NOTE — NUR
MS RN NOTES

RECEIVED LYING ON BED,BREATHING EASY,NO SOB.IV SITE APPEARS SWOLLEN AND REDDISH,PER 
PATIENT,PAIN FUL WHEN FLUSH WITH SALINE LOCK.WILL NEW SALINE LOCK.SUPPOSED TO BE DISCHARGE 
BUT THERE'S PROBLEM WITH TRANSPORTATION,[facility]IAN[facility]NEY IS NOT COVERED.MD MADE AWARE,HOLD DISCHARGE 
TONIGHT.WILL FOLLOW UP IN THE MORNING.RIGHT HUMERUS SURGERY WITH DRESSING INTACT AND DRY 
WRAPPED WITH ELASTIC BANDAGE.KNEE IMMOBILIZER IN USED.WILL CONTINUE TO MONITOR STATUS.CALL 
LIGHT IN REACH,NEEDS ANTICIPATED.

## 2023-05-05 NOTE — NUR
CLOSING NOTE 



PATIENT AWAKE, WATCHING TV.  AWAKE A/Ox4. ON ROOM AIR,  NO S/S OF SOB OR LABORED BREATHING. 
IV ACCESS LFA G22 INTACT AND PATENT. FLUSHING WELL. PAIN MANAGEMENT MAINTAINED PER MD 
ORDERS, ALL MEDICATION ADMINISTERED AS SCHEDULED.  SAFETY AND FALL PRECAUTION MAINTAINED BED 
LOCKED AND AT THE LOWEST POSITION, SRX2, CALL LIGHT WITHIN REACH.

-------------------------------------------------------------------------------

Addendum: 05/05/23 at 1913 by BLANCO XIONG LVN

-------------------------------------------------------------------------------

TIME CORRECTION: 1913

## 2023-05-05 NOTE — NUR
MS RN NOTES  PAIN MANAGEMENT

AWAKE,HAVING PAIN ON RIGHT LEG 9/10 ON PAIN SCALE.MEDICATED WITH NORCO 5/325MG,2 TABS PO FOR 
STRONG PAIN AND PER PATIENT REQUEST.

## 2023-05-05 NOTE — NUR
MS RN NOTES  PAIN MANAGEMENT

C/O PAIN 8/10 ON PAIN SCALE ON RIGHT LEG,MEDICATED WITH DILAUDID 1MG IV AS ORDERED FOR 
STRONG PAIN AND PER PATIENT REQUEST.VITAL SIGNS STABLE.

## 2023-05-05 NOTE — NUR
RN closing notes

Pt is resting in bed comfortably. Pt is alert and orientedX3 with episode of confusion. On 
room air. VS is stable. No SOB. No S/s of distress noted. IV site at L wrist is clean, 
intact and SL. Iv site at LFA# 22 is clean, intact and flushes well. Self cath is inplaced 
and draining yellow  R groin IVC dressing is clean, intact and dry. R knee ace wrapped is 
C/D/I with immobilizer and offload. Kept Pt clean, dry and comfortable. Safety precautions 
is maintained. Bed at low position, brakes locked, side rails upx2, bed alarm is on and call 
light is within reach. Will endorse to am nurse for EDDA.

## 2023-05-05 NOTE — NUR
RN notes

Pt is complaining of pain on R knee. administered norco/2tabs/po/prn as ordered. safety 
precautions is maintained.

## 2023-05-05 NOTE — NUR
RN NOTES; PAIN MGT



RN ADMINISTERED IV PUSH DILAUDID 1 MG, PULLED BY LVN, WILL REASSESS PER PROTOCOL 

-------------------------------------------------------------------------------

Addendum: 05/05/23 at 1602 by MARIA E TOMLINSON RN

-------------------------------------------------------------------------------

PT REPORTS PAIN 8/10, GENERALIZED

## 2023-05-05 NOTE — NUR
MS RN NOTES

NEW SALINE LOCK PLACE ON RIGHT FOREARM #22,FLUSHED WITH NS AND KEPT PATENT.SAME IVF RE 
STARTED.

## 2023-05-05 NOTE — NUR
D/C TRANSPORTATION UPDATE



TRANSPORTATION CALLED UNABLE TO TRANSPORT PATIENT DUE TO AN ISSUE WITH INSURANCE COVERAGE 
[employer]IA [employer]. TRANSPORTATION MUST BE RESCHEDULE FOR D/C. FOLLOW UP WITH CASE MANAGEMENT 
NEEDED, NOT AVAILABLE AT THIS TIME.

## 2023-05-05 NOTE — NUR
OPENING NOTE



PATIENT RECEIVED AWAKE A/Ox4. ON ROOM AIR,  NO S/S OF SOB OR LABORED BREATHING. IV ACCESS 
LFA G22 INTACT AND PATENT. FLUSHING WELL. NO S/S OF EDEMA CAPILLARY REFILL <3.SKIN INTACT. 
HYPOACTIVE BOWEL SOUNDS AND CLEAR LUNGS.  SAFETY AND FALL PRECAUTION IN PLACE, BED LOCKED 
AND AT THE LOWEST POSITION, SRX2, CALL LIGHT WITHIN REACH. .

## 2023-05-06 VITALS — SYSTOLIC BLOOD PRESSURE: 163 MMHG | DIASTOLIC BLOOD PRESSURE: 95 MMHG

## 2023-05-06 RX ADMIN — Medication PRN TAB: at 05:30

## 2023-05-06 RX ADMIN — HYDROMORPHONE HYDROCHLORIDE PRN MG: 1 INJECTION, SOLUTION INTRAMUSCULAR; INTRAVENOUS; SUBCUTANEOUS at 09:19

## 2023-05-06 RX ADMIN — Medication PRN TAB: at 10:31

## 2023-05-06 RX ADMIN — HYDROMORPHONE HYDROCHLORIDE PRN MG: 1 INJECTION, SOLUTION INTRAMUSCULAR; INTRAVENOUS; SUBCUTANEOUS at 02:23

## 2023-05-06 RX ADMIN — SODIUM CHLORIDE SCH MLS/HR: 9 INJECTION, SOLUTION INTRAVENOUS at 14:00

## 2023-05-06 RX ADMIN — Medication PRN TAB: at 14:32

## 2023-05-06 RX ADMIN — SODIUM CHLORIDE PRN MLS/HR: 9 INJECTION, SOLUTION INTRAVENOUS at 04:58

## 2023-05-06 RX ADMIN — SODIUM CHLORIDE PRN MLS/HR: 9 INJECTION, SOLUTION INTRAVENOUS at 05:25

## 2023-05-06 NOTE — NUR
RN NOTE



PATIENT REFUSED NS SAY HE IS LEAVING SOON AND GOING TO BE DISCHARGE. 

-------------------------------------------------------------------------------

Addendum: 05/06/23 at 1724 by ROCK MARCELLO RESENDIZ RN

-------------------------------------------------------------------------------

ADDENDUM 



PATIENT REFUSED IV FOR F

-------------------------------------------------------------------------------

Addendum: 05/06/23 at 1724 by ROCK MARCELLO RESENDIZ RN

-------------------------------------------------------------------------------

ADDENDUM 



PATIENT REFUSED IV FERRLECIT AND NS

## 2023-05-06 NOTE — NUR
MS RN OPENING NOTE



RECEIVED PATIENT IN BED RESTING, APPEARS TO BE COMFORTABLE, A/OX4, NO S/S OF PAIN AT THIS 
TIME, ON ROOM AIR, NO S/S OF RESPIRATORY DISTRESS, IV ACCESS R FA #22 INTACT ANT PATENT, 
REFUSING IV FLUIDS AT THIS TIME, PATIENT USES URINAL, AMBULATORY WITH ASSIST, ON REGULAR 
DIET, FALL AND SAFETY MEASURES IN PLACE,BED IN LOW AND LOCK POSITION, CALL LIGHT AND TABLE 
WITHIN EASY REACH, SIDE RAILS UP X2.

## 2023-05-06 NOTE — NUR
MS RN NOTES

FAIRLY RESTED AT NIGHT,STILL WITH ON AND OFF ABDOMINAL PAIN MANAGE WITH DILAUDID 1MG IV AS 
ORDERED.VITAL SIGNS STABLE.VOIDING FREELY PER URINAL.CALL LIGHT IN REACH,NEEDS ANTICIPATED

## 2023-05-06 NOTE — NUR
RN DISCHARGE NOTE



RECEIVED ORDER TO DISCHARGE. PATIENT IS A/OX4, ABLE TO MAKE NEEDS KNOWN, STABLE ON ROOM AIR, 
NO SOB OR S/S OF DISTRESS NOTED, REPORT GIVEN TO SUKHI FLOOD, DISCHARGE INSTRUCTION GIVEN, 
PATIENT VERBALIZED UNDERSTANDING. ALL BELONGINGS ACCOUNTED FOR, BELONGING SHEET SIGNED, IV 
ACCESS REMOVED, CATHETER TIP INTACT, PRESSURE DRESSING APPLIED. EXIT CARE FOLDER GIVEN TO 
EMT, ID BAND REMOVED. PATIENT LEFT UNIT STABLE AT 1600 VIA GURNEY ACCOMPANIED BY 2 EMT'S, 
CHARGE NURSE AND MD AWARE OF DISCHARGE.

## 2023-05-06 NOTE — NUR
MS RN NOTES  PAIN MANAGEMENT

HAVING RIGHT LEG PAIN 8/10 ON PAIN SCALE,DILAUDID 1MG IV GIVEN PER PATIENT REQUEST FOR 
SEVERE PAIN.VITAL SIGNS STABLE.